# Patient Record
Sex: MALE | Race: BLACK OR AFRICAN AMERICAN
[De-identification: names, ages, dates, MRNs, and addresses within clinical notes are randomized per-mention and may not be internally consistent; named-entity substitution may affect disease eponyms.]

---

## 2017-05-14 ENCOUNTER — HOSPITAL ENCOUNTER (EMERGENCY)
Dept: HOSPITAL 61 - ER | Age: 57
Discharge: HOME | End: 2017-05-14
Payer: COMMERCIAL

## 2017-05-14 VITALS
DIASTOLIC BLOOD PRESSURE: 82 MMHG | SYSTOLIC BLOOD PRESSURE: 136 MMHG | DIASTOLIC BLOOD PRESSURE: 82 MMHG | SYSTOLIC BLOOD PRESSURE: 136 MMHG

## 2017-05-14 VITALS — HEIGHT: 76 IN | BODY MASS INDEX: 33.36 KG/M2 | WEIGHT: 274 LBS
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
## 2017-05-14 DIAGNOSIS — K21.9: ICD-10-CM
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
## 2017-05-14 DIAGNOSIS — E11.9: ICD-10-CM

## 2017-05-14 DIAGNOSIS — S61.412A: Primary | ICD-10-CM
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
## 2017-05-14 PROCEDURE — 90715 TDAP VACCINE 7 YRS/> IM: CPT

## 2017-05-14 PROCEDURE — 12001 RPR S/N/AX/GEN/TRNK 2.5CM/<: CPT

## 2017-05-14 PROCEDURE — 90471 IMMUNIZATION ADMIN: CPT

## 2017-05-14 NOTE — PHYS DOC
Past Medical History


Past Medical History:  Diabetes-Type II, GERD


Additional Past Medical Histor:  seasonal allergies


Past Surgical History:  No Surgical History


Alcohol Use:  None


Drug Use:  None





Adult General


Chief Complaint


Chief Complaint:  LACERATION/AVULSION





HPI


HPI





Patient is a 57  year old male with history of diabetes type 2 who presents 

today with left lateral hand laceration. Patient states he got cut accidentally 

with an electric knife wearing rubber gloves cutting bushes.





Review of Systems


Review of Systems





Constitutional: Denies fever or chills []


Eyes: Denies change in visual acuity, redness, or eye pain []


HENT: Denies nasal congestion or sore throat []


Musculoskeletal: Denies back pain or joint pain []


Integument: Left lateral hand laceration


Neurologic: Denies headache, focal weakness or sensory changes []


Endocrine: Denies polyuria or polydipsia []





Current Medications


Current Medications





Current Medications








 Medications


  (Trade)  Dose


 Ordered  Sig/Mary  Start Time


 Stop Time Status Last Admin


Dose Admin


 


 Diphtheria/


 Tetanus/Acell


 Pertussis


  (Boostrix)  0.5 ml  ONCE ONCE  5/14/17 19:30


 5/14/17 19:31 DC  


 


 


 Lidocaine/Sodium


 Bicarbonate


  (Buffered


 Lidocaine 1%)  20 ml  1X  ONCE  5/14/17 19:30


 5/14/17 19:31 DC 5/14/17 19:30


20 ML











Allergies


Allergies





Allergies








Coded Allergies Type Severity Reaction Last Updated Verified


 


  No Known Drug Allergies    2/27/16 No











Physical Exam


Physical Exam





Constitutional: Well developed, well nourished, no acute distress, non-toxic 

appearance. []


HENT: Normocephalic, atraumatic, bilateral external ears normal, oropharynx 

moist, no oral exudates, nose normal. []


Eyes: PERRLA, EOMI, conjunctiva normal, no discharge. [] 


Neck: Normal range of motion, no tenderness, supple, no stridor. [] 


Skin: Left lateral hand just below the distal transverse line with a horizontal 

laceration approximately 2 cm long. There is no obvious tendon involvement. 

Patient able to flex and extend the left hand and fingers with no difficulties. 

+2 left radial pulse. Adequate radial medial and ulnar sensation to the left 

hand and fingers. Cap refill less than 2 seconds the left upper extremity.


Back: No tenderness, no CVA tenderness. [] 


Extremities: No tenderness, no cyanosis, no clubbing, ROM intact, no edema. [] 


Neurologic: Alert and oriented X 3, normal motor function, normal sensory 

function, no focal deficits noted. []


Psychologic: Affect normal, judgement normal, mood normal. []





Current Patient Data


Vital Signs





 Vital Signs








  Date Time  Temp Pulse Resp B/P (MAP) Pulse Ox O2 Delivery O2 Flow Rate FiO2


 


5/14/17 19:20 98.5 110 18  95 Room Air  





 98.5       











EKG


EKG


[]





Radiology/Procedures


Radiology/Procedures


Indication: Left hand laceration





Procedure: The patient was placed in the appropriate position and anesthesia 

around the laceration was 1% buffered lidocaine. The area was then cleaned with 

100 ML of normal saline and Betadine. The laceration was closed with 3 

interrupted sutures using 4. 0 Ethilon. The wound was covered with gauze





Total repaired wound length: Approximately 2 cm





Other Items: None





The patient tolerated the procedure well





Complications: well





Course & Med Decision Making


Course & Med Decision Making


Pertinent Labs and Imaging studies reviewed. (See chart for details)





Patient has laceration to the left lateral hand. He was wearing rubber gloves 

when he got cut. Laceration was closed as noted in procedures. He is to follow-

up with the ED in 7-10 days or the primary care doctor for suture removal. He 

was given tetanus in the ED. Provided return precautions and discharged in 

stable condition. Discharged with Cipro.





Dragon Disclaimer


Dragon Disclaimer


This electronic medical record was generated, in whole or in part, using a 

voice recognition dictation system.





Departure


Departure


Impression:  


 Primary Impression:  


 Laceration of left hand


Disposition:  01 HOME, SELF-CARE


Condition:  STABLE


Referrals:  


YADIEL GEORGE DO (PCP)


Follow-up with your own doctor or the emergency room in 7-10 days for suture 

removal


Patient Instructions:  Laceration Care, Adult





Additional Instructions:  


You were seen with left hand laceration. Keep the area clean and dry. Apply 

Neosporin to it twice a day. Follow-up with your doctor or the emergency room 

in 7-10 days for suture removal. You received tetanus in the emergency room 

today.


Scripts


Ciprofloxacin Hcl (CIPRO) 500 Mg Tablet


1 TAB PO BID, #14 TAB


   Prov: MARIAMA SMITH APRN         5/14/17





Problem Qualifiers








 Primary Impression:  


 Laceration of left hand


 Encounter type:  initial encounter  Qualified Codes:  S61.412A - Laceration 

without foreign body of left hand, initial encounter








MUTUNGA,MARIAMA APRN May 14, 2017 19:29

## 2017-09-29 ENCOUNTER — HOSPITAL ENCOUNTER (OUTPATIENT)
Dept: HOSPITAL 35 - PAIN | Age: 57
Discharge: HOME | End: 2017-09-29
Attending: ANESTHESIOLOGY
Payer: COMMERCIAL

## 2017-09-29 VITALS — DIASTOLIC BLOOD PRESSURE: 94 MMHG | SYSTOLIC BLOOD PRESSURE: 139 MMHG

## 2017-09-29 VITALS — BODY MASS INDEX: 32.63 KG/M2 | HEIGHT: 75.98 IN | WEIGHT: 268 LBS

## 2017-09-29 DIAGNOSIS — M48.02: ICD-10-CM

## 2017-09-29 DIAGNOSIS — I25.10: ICD-10-CM

## 2017-09-29 DIAGNOSIS — Z98.890: ICD-10-CM

## 2017-09-29 DIAGNOSIS — K21.9: ICD-10-CM

## 2017-09-29 DIAGNOSIS — M54.12: Primary | ICD-10-CM

## 2017-09-29 DIAGNOSIS — Z79.1: ICD-10-CM

## 2017-09-29 DIAGNOSIS — E11.9: ICD-10-CM

## 2017-10-13 ENCOUNTER — HOSPITAL ENCOUNTER (OUTPATIENT)
Dept: HOSPITAL 35 - PAIN | Age: 57
End: 2017-10-13
Attending: ANESTHESIOLOGY
Payer: COMMERCIAL

## 2017-10-13 VITALS — DIASTOLIC BLOOD PRESSURE: 58 MMHG | SYSTOLIC BLOOD PRESSURE: 168 MMHG

## 2017-10-13 VITALS — HEIGHT: 75.98 IN | BODY MASS INDEX: 32.88 KG/M2 | WEIGHT: 270 LBS

## 2017-10-13 DIAGNOSIS — Z79.82: ICD-10-CM

## 2017-10-13 DIAGNOSIS — Z79.899: ICD-10-CM

## 2017-10-13 DIAGNOSIS — M54.12: Primary | ICD-10-CM

## 2017-12-18 ENCOUNTER — HOSPITAL ENCOUNTER (OUTPATIENT)
Dept: HOSPITAL 35 - PAIN | Age: 57
Discharge: HOME | End: 2017-12-18
Attending: ANESTHESIOLOGY
Payer: COMMERCIAL

## 2017-12-18 VITALS — DIASTOLIC BLOOD PRESSURE: 87 MMHG | SYSTOLIC BLOOD PRESSURE: 126 MMHG

## 2017-12-18 VITALS — HEIGHT: 75.98 IN | BODY MASS INDEX: 33.17 KG/M2 | WEIGHT: 272.4 LBS

## 2017-12-18 DIAGNOSIS — M54.12: Primary | ICD-10-CM

## 2017-12-18 DIAGNOSIS — Z79.82: ICD-10-CM

## 2017-12-18 DIAGNOSIS — Z79.899: ICD-10-CM

## 2017-12-18 DIAGNOSIS — G89.29: ICD-10-CM

## 2018-02-02 ENCOUNTER — HOSPITAL ENCOUNTER (OUTPATIENT)
Dept: HOSPITAL 35 - RAD | Age: 58
End: 2018-02-02
Attending: FAMILY MEDICINE
Payer: COMMERCIAL

## 2018-02-02 DIAGNOSIS — R05: Primary | ICD-10-CM

## 2018-04-02 ENCOUNTER — HOSPITAL ENCOUNTER (OUTPATIENT)
Dept: HOSPITAL 35 - ULTRA | Age: 58
End: 2018-04-02
Attending: FAMILY MEDICINE
Payer: COMMERCIAL

## 2018-04-02 DIAGNOSIS — M25.562: Primary | ICD-10-CM

## 2018-04-17 ENCOUNTER — HOSPITAL (OUTPATIENT)
Dept: OTHER | Age: 58
End: 2018-04-17
Attending: NURSE PRACTITIONER

## 2018-05-14 ENCOUNTER — HOSPITAL ENCOUNTER (OUTPATIENT)
Dept: HOSPITAL 35 - PAIN | Age: 58
Discharge: HOME | End: 2018-05-14
Attending: ANESTHESIOLOGY
Payer: COMMERCIAL

## 2018-05-14 VITALS — HEIGHT: 72.01 IN | WEIGHT: 268 LBS | BODY MASS INDEX: 36.3 KG/M2

## 2018-05-14 VITALS — DIASTOLIC BLOOD PRESSURE: 79 MMHG | SYSTOLIC BLOOD PRESSURE: 126 MMHG

## 2018-05-14 DIAGNOSIS — M54.12: Primary | ICD-10-CM

## 2018-05-14 DIAGNOSIS — Z79.899: ICD-10-CM

## 2018-05-14 DIAGNOSIS — Z98.890: ICD-10-CM

## 2018-05-14 DIAGNOSIS — G89.29: ICD-10-CM

## 2018-05-14 DIAGNOSIS — Z79.82: ICD-10-CM

## 2018-05-31 ENCOUNTER — HOSPITAL ENCOUNTER (OUTPATIENT)
Dept: HOSPITAL 35 - PAIN | Age: 58
End: 2018-05-31
Attending: ANESTHESIOLOGY
Payer: COMMERCIAL

## 2018-05-31 VITALS — SYSTOLIC BLOOD PRESSURE: 135 MMHG | DIASTOLIC BLOOD PRESSURE: 83 MMHG

## 2018-05-31 VITALS — BODY MASS INDEX: 32.39 KG/M2 | HEIGHT: 75.98 IN | WEIGHT: 266 LBS

## 2018-05-31 DIAGNOSIS — M54.12: Primary | ICD-10-CM

## 2018-06-18 ENCOUNTER — HOSPITAL ENCOUNTER (OUTPATIENT)
Dept: HOSPITAL 35 - PAIN | Age: 58
Discharge: HOME | End: 2018-06-18
Attending: ANESTHESIOLOGY
Payer: COMMERCIAL

## 2018-06-18 VITALS — WEIGHT: 260.4 LBS | HEIGHT: 75.98 IN | BODY MASS INDEX: 31.71 KG/M2

## 2018-06-18 VITALS — DIASTOLIC BLOOD PRESSURE: 81 MMHG | SYSTOLIC BLOOD PRESSURE: 129 MMHG

## 2018-06-18 DIAGNOSIS — M54.12: Primary | ICD-10-CM

## 2018-06-18 DIAGNOSIS — Z98.890: ICD-10-CM

## 2018-06-18 DIAGNOSIS — Z79.899: ICD-10-CM

## 2018-06-18 DIAGNOSIS — G89.29: ICD-10-CM

## 2018-06-18 DIAGNOSIS — Z79.82: ICD-10-CM

## 2018-08-07 ENCOUNTER — HOSPITAL ENCOUNTER (OUTPATIENT)
Dept: HOSPITAL 61 - PCVCIMAG | Age: 58
Discharge: HOME | End: 2018-08-07
Attending: INTERNAL MEDICINE
Payer: COMMERCIAL

## 2018-08-07 DIAGNOSIS — E11.9: ICD-10-CM

## 2018-08-07 DIAGNOSIS — I51.7: ICD-10-CM

## 2018-08-07 DIAGNOSIS — R06.09: ICD-10-CM

## 2018-08-07 DIAGNOSIS — I25.10: Primary | ICD-10-CM

## 2018-08-07 PROCEDURE — 93306 TTE W/DOPPLER COMPLETE: CPT

## 2019-03-14 ENCOUNTER — HOSPITAL ENCOUNTER (OUTPATIENT)
Dept: HOSPITAL 61 - PCVCIMAG | Age: 59
Discharge: HOME | End: 2019-03-14
Attending: INTERNAL MEDICINE
Payer: COMMERCIAL

## 2019-03-14 DIAGNOSIS — E11.9: ICD-10-CM

## 2019-03-14 DIAGNOSIS — E78.5: ICD-10-CM

## 2019-03-14 DIAGNOSIS — I10: ICD-10-CM

## 2019-03-14 DIAGNOSIS — I25.10: Primary | ICD-10-CM

## 2019-03-14 DIAGNOSIS — I48.0: ICD-10-CM

## 2019-03-14 PROCEDURE — 93325 DOPPLER ECHO COLOR FLOW MAPG: CPT

## 2019-03-14 PROCEDURE — 93351 STRESS TTE COMPLETE: CPT

## 2019-03-15 NOTE — PCVCIMAG
--------------- APPROVED REPORT --------------





Study performed:  03/14/2019 14:57:55



Exam:  Stress Echocardiogram

Indication: CAD , Hyperlipidemia, Hypertension

Patient Location: Echo lab

Stress Nurse: Urszula Rader RN

Status: routine



Ht: 5 ft 5 in  

HR: 122 bpm      BP: 110/80 mmHg

Rhythm: Sinus Tachycardia



Medical History

Medical History: HTN, Hyperlipidemia, Diabetes, paroxysmal atrial 

fib



Procedure

The patient underwent an Exercise Stress Test using the Mike 

Protocol. Blood pressure, heart rate, and EKG were monitored.

An Echocardiogram was performed by technician in four stages in quad 

fashion.  At peak stress, four selected images were obtained and 

placed side by side with resting images for comparison.



Stress Test Details

Stress Test:  Exercise stress testing was performed using a Mike 

protocol.

HR

Resting HR:            122 bpmMax Heart Rate (APMHR): 161 bpm 



Max HR Achieved:  155 bpmTarget HR (85% APMHR): 136 bpm

% of APMHR:         96

Recovery HR:            120 bpm

HR response to stress: Accelerated HR response to stress



BP

Resting BP:  110/80 mmHg

Max BP:       162/80 mmHg

Recovery BP:       122/68 mmHg

BP response to stress: Normal blood pressure response to 

stress.

ECG

Resting ECG:  Sinus Tachycardia

Stress ECG:     Sinus Tachycardia

Recovery ECG: Sinus Tachycardia



Clinical

Reason for Termination: Maximal effort

Exercise duration: 7 min 38 sec

Highest Stage Achieved: Stage 3: 3.4 mph at 14% grade. 

Exercise capacity: 10.40 METs

Overall Exercise Capacity for Age: Poor



Stress ECG Conclusion

1.subjectively negative for ischemia

2. electrocardiographically negative for ischemia

3. reduced functional capacity



Pre-Stress Echo

The resting Echocardiogram showed abnormal left ventricular 

contractility with an estimated Ejection Fraction of about 40-45%. 

Distal septal hypokinesis.



Post-Stress Echo

The stress Echocardiogram showed abnormal left ventricular 

contractility with an estimated Ejection Fraction of about 45-50%. 

Distal septal hypokineis.



Conclusion

Clinical Response:  Non-ischemic

Exercise Capacity:  Below Average

Stress ECG Response:  Non-ischemic

Stress Echo Images:  Non-ischemic

1. low to intermediate risk study based on wall motion abnormalities 

at rest



Other Information

Study Quality: Technically Difficult



<Conclusion>

1. low to intermediate risk study based on wall motion abnormalities 

at rest

## 2019-12-04 ENCOUNTER — HOSPITAL (OUTPATIENT)
Dept: OTHER | Age: 59
End: 2019-12-04
Attending: HOSPITALIST

## 2019-12-04 LAB
A/G RATIO_: 1.1
ABS LYMPH: 1.1 K/CUMM (ref 1–3.5)
ABS MONO: 0.8 K/CUMM (ref 0.1–0.8)
ABS NEUTRO: 5.4 K/CUMM (ref 2–8)
ACETAMINOPH LVL: <3 UG/ML (ref 10–30)
ALBUMIN: 3.7 G/DL (ref 3.5–5)
ALCOHOL, ETHYL: 79 MG/DL (ref 0–10)
ALK PHOS: 66 UNIT/L (ref 50–124)
ALT/GPT: 56 UNIT/L (ref 0–55)
ANION GAP SERPL CALC-SCNC: 12 MEQ/L (ref 10–20)
ANION GAP SERPL CALC-SCNC: 18 MEQ/L (ref 10–20)
AST/GOT: 96 UNIT/L (ref 5–34)
BASOPHIL: 0 % (ref 0–1)
BILI TOTAL: 1.2 MG/DL (ref 0.2–1)
BUN SERPL-MCNC: 21 MG/DL (ref 6–20)
BUN SERPL-MCNC: 23 MG/DL (ref 6–20)
CALCIUM: 9.1 MG/DL (ref 8.4–10.2)
CALCIUM: 9.5 MG/DL (ref 8.4–10.2)
CHLORIDE: 76 MEQ/L (ref 97–107)
CHLORIDE: 82 MEQ/L (ref 97–107)
CREATININE: 1.41 MG/DL (ref 0.6–1.3)
CREATININE: 1.5 MG/DL (ref 0.6–1.3)
DIFF_TYPE?: ABNORMAL
EOSINOPHIL: 0 % (ref 0–6)
GLOBULIN_: 3.5 G/DL (ref 2–4.1)
GLUCOSE LVL: 143 MG/DL (ref 70–99)
GLUCOSE LVL: 150 MG/DL (ref 70–99)
HCT VFR BLD CALC: 46 % (ref 36–51)
HEMOLYSIS 2+: ABNORMAL
HEMOLYSIS 2+: NEGATIVE
HEMOLYSIS 2+: NORMAL
HEMOLYSIS 3+: NORMAL
HEMOLYSIS 4+: NORMAL
HGB BLD-MCNC: 15.8 G/DL (ref 12–17)
ICTERIC 4+: NEGATIVE
IMMATURE GRAN: 0.7 % (ref 0–0.3)
INSTR WBC: 7.4 K/CUMM (ref 4–11)
LIPEMIC 3+: NEGATIVE
LIPEMIC 4+: NEGATIVE
LYMPHOCYTE: 15 %
MAGNESIUM LEVEL: 1.8 MG/DL (ref 1.6–2.6)
MCH RBC QN AUTO: 31 PG (ref 25–35)
MCHC RBC AUTO-ENTMCNC: 34 G/DL (ref 32–37)
MCV RBC AUTO: 89 FL (ref 78–97)
MONOCYTE: 11 %
NEUTROPHIL: 72 %
NRBC BLD MANUAL-RTO: 0 % (ref 0–0.2)
PHOSPHORUS: 2.3 MG/DL (ref 2.3–4.7)
PLATELET: 64 K/CUMM (ref 150–450)
POTASSIUM: 2.4 MEQ/L (ref 3.5–5.1)
POTASSIUM: 2.9 MEQ/L (ref 3.5–5.1)
RBC # BLD: 5.16 M/CUMM (ref 4.2–6)
RDW: 13.5 % (ref 11.5–14.5)
SALICYLATE LVL: <5 MG/DL (ref 0–20)
SODIUM: 128 MEQ/L (ref 136–145)
SODIUM: 131 MEQ/L (ref 136–145)
TCO2: 36 MEQ/L (ref 19–29)
TCO2: 40 MEQ/L (ref 19–29)
TOTAL PROTEIN: 7.2 G/DL (ref 6.4–8.3)
TROPONIN I: 0.03 NG/ML
U AMPH SCRN: NEGATIVE
U BARB SCRN: NEGATIVE
U BENZODIA SCRN: NEGATIVE
U COCAINE SCRN: NEGATIVE
U OPIATE SCRN: NEGATIVE
U PCP SCRN: NEGATIVE
U THC SCRN: NEGATIVE
UA APPEAR: CLEAR
UA BACTERIA: ABNORMAL
UA BILI: NEGATIVE
UA BLOOD: ABNORMAL
UA COLOR: YELLOW
UA EPITHELIAL: ABNORMAL
UA GLUCOSE: NEGATIVE
UA KETONES: NEGATIVE
UA LEUK EST: NEGATIVE
UA NITRITE: NEGATIVE
UA PH: 6 (ref 5–7)
UA PROTEIN: NEGATIVE
UA RBC: ABNORMAL
UA SPEC GRAV: <=1.005 (ref 1.01–1.02)
UA UROBILINOGEN: 0.2 MG/DL (ref 0.2–1)
UA WBC: ABNORMAL
WBC # BLD: 7.4 K/CUMM (ref 4–11)

## 2019-12-04 PROCEDURE — 99223 1ST HOSP IP/OBS HIGH 75: CPT | Performed by: HOSPITALIST

## 2019-12-05 LAB
A/G RATIO_: 1
ABS NEUTRO: 3.5 K/CUMM (ref 2–8)
ALBUMIN: 3.2 G/DL (ref 3.5–5)
ALK PHOS: 62 UNIT/L (ref 50–124)
ALT/GPT: 53 UNIT/L (ref 0–55)
ANION GAP SERPL CALC-SCNC: 11 MEQ/L (ref 10–20)
ANION GAP SERPL CALC-SCNC: 11 MEQ/L (ref 10–20)
AST/GOT: 70 UNIT/L (ref 5–34)
BILI TOTAL: 1.2 MG/DL (ref 0.2–1)
BUN SERPL-MCNC: 18 MG/DL (ref 6–20)
BUN SERPL-MCNC: 20 MG/DL (ref 6–20)
CALCIUM: 9 MG/DL (ref 8.4–10.2)
CALCIUM: 9 MG/DL (ref 8.4–10.2)
CHLORIDE: 93 MEQ/L (ref 97–107)
CHLORIDE: 95 MEQ/L (ref 97–107)
CREATININE: 1.28 MG/DL (ref 0.6–1.3)
CREATININE: 1.31 MG/DL (ref 0.6–1.3)
GLOBULIN_: 3.1 G/DL (ref 2–4.1)
GLUCOSE LVL: 120 MG/DL (ref 70–99)
GLUCOSE LVL: 150 MG/DL (ref 70–99)
HCT VFR BLD CALC: 42 % (ref 36–51)
HEMOLYSIS 2+: ABNORMAL
HEMOLYSIS 2+: NEGATIVE
HEMOLYSIS 2+: NEGATIVE
HGB BLD-MCNC: 14.6 G/DL (ref 12–17)
INSTR WBC: 5.4 K/CUMM (ref 4–11)
LIPEMIC 3+: NEGATIVE
MAGNESIUM LEVEL: 1.9 MG/DL (ref 1.6–2.6)
MCH RBC QN AUTO: 31 PG (ref 25–35)
MCHC RBC AUTO-ENTMCNC: 34 G/DL (ref 32–37)
MCV RBC AUTO: 89 FL (ref 78–97)
NRBC BLD MANUAL-RTO: 0 % (ref 0–0.2)
PLATELET: 69 K/CUMM (ref 150–450)
POTASSIUM: 2.9 MEQ/L (ref 3.5–5.1)
POTASSIUM: 3.7 MEQ/L (ref 3.5–5.1)
RBC # BLD: 4.76 M/CUMM (ref 4.2–6)
RDW: 13.8 % (ref 11.5–14.5)
SODIUM: 134 MEQ/L (ref 136–145)
SODIUM: 134 MEQ/L (ref 136–145)
TCO2: 32 MEQ/L (ref 19–29)
TCO2: 33 MEQ/L (ref 19–29)
TOTAL PROTEIN: 6.3 G/DL (ref 6.4–8.3)
WBC # BLD: 5.4 K/CUMM (ref 4–11)

## 2019-12-05 PROCEDURE — 99233 SBSQ HOSP IP/OBS HIGH 50: CPT | Performed by: HOSPITALIST

## 2019-12-06 LAB
ABS NEUTRO: 3.6 K/CUMM (ref 2–8)
ANION GAP SERPL CALC-SCNC: 9 MEQ/L (ref 10–20)
BUN SERPL-MCNC: 17 MG/DL (ref 6–20)
CALCIUM: 9 MG/DL (ref 8.4–10.2)
CHLORIDE: 98 MEQ/L (ref 97–107)
CREATININE: 1.21 MG/DL (ref 0.6–1.3)
GLUCOSE LVL: 127 MG/DL (ref 70–99)
HCT VFR BLD CALC: 43 % (ref 36–51)
HEMOLYSIS 2+: NEGATIVE
HGB BLD-MCNC: 14.5 G/DL (ref 12–17)
INSTR WBC: 6.3 K/CUMM (ref 4–11)
MCH RBC QN AUTO: 31 PG (ref 25–35)
MCHC RBC AUTO-ENTMCNC: 34 G/DL (ref 32–37)
MCV RBC AUTO: 91 FL (ref 78–97)
NRBC BLD MANUAL-RTO: 0 % (ref 0–0.2)
PLATELET: 101 K/CUMM (ref 150–450)
POTASSIUM: 3.2 MEQ/L (ref 3.5–5.1)
RBC # BLD: 4.71 M/CUMM (ref 4.2–6)
RDW: 13.8 % (ref 11.5–14.5)
SODIUM: 133 MEQ/L (ref 136–145)
TCO2: 29 MEQ/L (ref 19–29)
WBC # BLD: 6.3 K/CUMM (ref 4–11)

## 2019-12-06 PROCEDURE — 99232 SBSQ HOSP IP/OBS MODERATE 35: CPT | Performed by: HOSPITALIST

## 2019-12-06 PROCEDURE — 90791 PSYCH DIAGNOSTIC EVALUATION: CPT | Performed by: PSYCHOLOGIST

## 2019-12-07 LAB
ABS NEUTRO: 3.1 K/CUMM (ref 2–8)
ANION GAP SERPL CALC-SCNC: 8 MEQ/L (ref 10–20)
BUN SERPL-MCNC: 28 MG/DL (ref 6–20)
CALCIUM: 9 MG/DL (ref 8.4–10.2)
CHLORIDE: 100 MEQ/L (ref 97–107)
CREATININE: 1.22 MG/DL (ref 0.6–1.3)
GLUCOSE LVL: 167 MG/DL (ref 70–99)
HCT VFR BLD CALC: 40 % (ref 36–51)
HEMOLYSIS 2+: NEGATIVE
HGB BLD-MCNC: 13.4 G/DL (ref 12–17)
INSTR WBC: 6.4 K/CUMM (ref 4–11)
MCH RBC QN AUTO: 30 PG (ref 25–35)
MCHC RBC AUTO-ENTMCNC: 34 G/DL (ref 32–37)
MCV RBC AUTO: 90 FL (ref 78–97)
NRBC BLD MANUAL-RTO: 0 % (ref 0–0.2)
PLATELET: 167 K/CUMM (ref 150–450)
POTASSIUM: 3.7 MEQ/L (ref 3.5–5.1)
RBC # BLD: 4.39 M/CUMM (ref 4.2–6)
RDW: 13.9 % (ref 11.5–14.5)
SODIUM: 133 MEQ/L (ref 136–145)
TCO2: 29 MEQ/L (ref 19–29)
WBC # BLD: 6.4 K/CUMM (ref 4–11)

## 2019-12-07 PROCEDURE — 99239 HOSP IP/OBS DSCHRG MGMT >30: CPT | Performed by: HOSPITALIST

## 2020-03-20 ENCOUNTER — HOSPITAL (OUTPATIENT)
Dept: OTHER | Age: 60
End: 2020-03-20
Attending: HOSPITALIST

## 2020-03-20 ENCOUNTER — HOSPITAL (OUTPATIENT)
Dept: OTHER | Age: 60
End: 2020-03-20

## 2020-03-20 LAB
A/G RATIO_: 1.2
ABS LYMPH: 1.5 K/CUMM (ref 1–3.5)
ABS MONO: 0.5 K/CUMM (ref 0.1–0.8)
ABS NEUTRO: 2.4 K/CUMM (ref 2–8)
ALBUMIN: 3.8 G/DL (ref 3.5–5)
ALCOHOL, ETHYL: 296 MG/DL (ref 0–10)
ALK PHOS: 82 UNIT/L (ref 50–124)
ALT/GPT: 112 UNIT/L (ref 0–55)
ANION GAP SERPL CALC-SCNC: 20 MEQ/L (ref 10–20)
APTT PPP: 24 SECONDS (ref 22–30)
AST/GOT: 255 UNIT/L (ref 5–34)
BASOPHIL: 2 % (ref 0–1)
BILI TOTAL: 0.9 MG/DL (ref 0.2–1)
BUN SERPL-MCNC: 6 MG/DL (ref 6–20)
CALCIUM: 9.3 MG/DL (ref 8.4–10.2)
CHLORIDE: 103 MEQ/L (ref 97–107)
CK INDEX: 2.1
CK-MB: 2.1 NG/ML
CREATININE: 0.73 MG/DL (ref 0.6–1.3)
DIFF_TYPE?: ABNORMAL
EOSINOPHIL: 2 % (ref 0–6)
GLOBULIN_: 3.3 G/DL (ref 2–4.1)
GLUCOSE LVL: 104 MG/DL (ref 70–99)
HAVM INSTR: 0.14
HBCO IGM: NON REACTIVE
HBSAG: NON REACTIVE
HCT VFR BLD CALC: 43 % (ref 36–51)
HCV INSTR: 0.07
HEMOLYSIS 2+: NEGATIVE
HEMOLYSIS 2+: NEGATIVE
HEMOLYSIS 3+: NEGATIVE
HEMOLYSIS 3+: NEGATIVE
HEMOLYSIS 4+: NEGATIVE
HEP A IGM: NON REACTIVE
HEP B CORE IGM INSTR: 0.07
HEP BS AG INSTR: 0.13
HEP C AB: NON REACTIVE
HGB BLD-MCNC: 14.6 G/DL (ref 12–17)
ICTERIC 4+: NEGATIVE
ICTERIC 4+: NEGATIVE
IMMATURE GRAN: 0.6 % (ref 0–0.3)
INR: 1 (ref 0.9–1.1)
INSTR WBC: 4.7 K/CUMM (ref 4–11)
LIPASE LEVEL: 8 UNIT/L (ref 8–78)
LIPEMIC 3+: NEGATIVE
LIPEMIC 4+: NEGATIVE
LYMPHOCYTE: 32 %
MAGNESIUM LEVEL: 1.7 MG/DL (ref 1.6–2.6)
MCH RBC QN AUTO: 34 PG (ref 25–35)
MCHC RBC AUTO-ENTMCNC: 34 G/DL (ref 32–37)
MCV RBC AUTO: 99 FL (ref 78–97)
MONOCYTE: 11 %
NEUTROPHIL: 52 %
NRBC BLD MANUAL-RTO: 0 % (ref 0–0.2)
PHOSPHORUS: 4.2 MG/DL (ref 2.3–4.7)
PLATELET: 163 K/CUMM (ref 150–450)
POTASSIUM: 3.6 MEQ/L (ref 3.5–5.1)
PROTHROMBIN TIME: 10.3 SECONDS (ref 9.7–11.6)
RBC # BLD: 4.36 M/CUMM (ref 4.2–6)
RDW: 13.5 % (ref 11.5–14.5)
SODIUM: 140 MEQ/L (ref 136–145)
TCO2: 21 MEQ/L (ref 19–29)
TOTAL CK: 100 U/L (ref 30–200)
TOTAL PROTEIN: 7.1 G/DL (ref 6.4–8.3)
TROPONIN I: <0.01 NG/ML
U AMPH SCRN: NEGATIVE
U BARB SCRN: NEGATIVE
U BENZODIA SCRN: NEGATIVE
U COCAINE SCRN: NEGATIVE
U OPIATE SCRN: NEGATIVE
U PCP SCRN: NEGATIVE
U THC SCRN: POSITIVE
UA APPEAR: CLEAR
UA BILI: NEGATIVE
UA BLOOD: NEGATIVE
UA COLOR: YELLOW
UA GLUCOSE: NEGATIVE
UA KETONES: NEGATIVE
UA LEUK EST: NEGATIVE
UA NITRITE: NEGATIVE
UA PH: 6 (ref 5–7)
UA PROTEIN: NEGATIVE
UA SPEC GRAV: <=1.005 (ref 1.01–1.02)
UA UROBILINOGEN: 0.2 MG/DL (ref 0.2–1)
WBC # BLD: 4.7 K/CUMM (ref 4–11)

## 2020-03-20 PROCEDURE — 99223 1ST HOSP IP/OBS HIGH 75: CPT | Performed by: HOSPITALIST

## 2020-03-21 LAB
A/G RATIO_: 1.3
ABS NEUTRO: 3 K/CUMM (ref 2–8)
ALBUMIN: 3.5 G/DL (ref 3.5–5)
ALK PHOS: 72 UNIT/L (ref 50–124)
ALT/GPT: 91 UNIT/L (ref 0–55)
ANION GAP SERPL CALC-SCNC: 14 MEQ/L (ref 10–20)
AST/GOT: 189 UNIT/L (ref 5–34)
BILI DIRECT: 0.9 MG/DL (ref 0–0.5)
BILI TOTAL: 1.7 MG/DL (ref 0.2–1)
BUN SERPL-MCNC: 7 MG/DL (ref 6–20)
CALCIUM: 8 MG/DL (ref 8.4–10.2)
CHLORIDE: 105 MEQ/L (ref 97–107)
CREATININE: 0.77 MG/DL (ref 0.6–1.3)
GLOBULIN_: 2.8 G/DL (ref 2–4.1)
GLUCOSE LVL: 95 MG/DL (ref 70–99)
HCT VFR BLD CALC: 38 % (ref 36–51)
HEMOLYSIS 2+: NEGATIVE
HGB BLD-MCNC: 13.3 G/DL (ref 12–17)
INSTR WBC: 5.1 K/CUMM (ref 4–11)
LIPEMIC 2+: NEGATIVE
MAGNESIUM LEVEL: 1.8 MG/DL (ref 1.6–2.6)
MCH RBC QN AUTO: 34 PG (ref 25–35)
MCHC RBC AUTO-ENTMCNC: 36 G/DL (ref 32–37)
MCV RBC AUTO: 97 FL (ref 78–97)
NRBC BLD MANUAL-RTO: 0 % (ref 0–0.2)
PLATELET: 146 K/CUMM (ref 150–450)
POTASSIUM: 3.8 MEQ/L (ref 3.5–5.1)
RBC # BLD: 3.88 M/CUMM (ref 4.2–6)
RDW: 13.4 % (ref 11.5–14.5)
SODIUM: 138 MEQ/L (ref 136–145)
TCO2: 23 MEQ/L (ref 19–29)
TOTAL PROTEIN: 6.3 G/DL (ref 6.4–8.3)
WBC # BLD: 5.1 K/CUMM (ref 4–11)

## 2020-03-21 PROCEDURE — 99233 SBSQ HOSP IP/OBS HIGH 50: CPT | Performed by: INTERNAL MEDICINE

## 2020-03-22 LAB
A/G RATIO_: 1
ALBUMIN: 3.4 G/DL (ref 3.5–5)
ALK PHOS: 72 UNIT/L (ref 50–124)
ALT/GPT: 74 UNIT/L (ref 0–55)
ANION GAP SERPL CALC-SCNC: 14 MEQ/L (ref 10–20)
AST/GOT: 95 UNIT/L (ref 5–34)
BILI DIRECT: 0.7 MG/DL (ref 0–0.5)
BILI TOTAL: 1.5 MG/DL (ref 0.2–1)
BUN SERPL-MCNC: 9 MG/DL (ref 6–20)
CALCIUM: 8.2 MG/DL (ref 8.4–10.2)
CHLORIDE: 103 MEQ/L (ref 97–107)
CREATININE: 0.72 MG/DL (ref 0.6–1.3)
GLOBULIN_: 3.4 G/DL (ref 2–4.1)
GLUCOSE LVL: 115 MG/DL (ref 70–99)
HEMOLYSIS 2+: ABNORMAL
HEMOLYSIS 2+: ABNORMAL
HEMOLYSIS 2+: NORMAL
LIPEMIC 2+: NEGATIVE
MAGNESIUM LEVEL: 1.6 MG/DL (ref 1.6–2.6)
POTASSIUM: 4.3 MEQ/L (ref 3.5–5.1)
SODIUM: 135 MEQ/L (ref 136–145)
TCO2: 22 MEQ/L (ref 19–29)
TOTAL PROTEIN: 6.8 G/DL (ref 6.4–8.3)

## 2020-03-22 PROCEDURE — 99233 SBSQ HOSP IP/OBS HIGH 50: CPT | Performed by: INTERNAL MEDICINE

## 2020-03-23 LAB
A/G RATIO_: 1.2
ALBUMIN: 3.7 G/DL (ref 3.5–5)
ALK PHOS: 74 UNIT/L (ref 50–124)
ALT/GPT: 59 UNIT/L (ref 0–55)
ANION GAP SERPL CALC-SCNC: 14 MEQ/L (ref 10–20)
AST/GOT: 58 UNIT/L (ref 5–34)
BILI TOTAL: 1.2 MG/DL (ref 0.2–1)
BUN SERPL-MCNC: 10 MG/DL (ref 6–20)
CALCIUM: 8.6 MG/DL (ref 8.4–10.2)
CHLORIDE: 104 MEQ/L (ref 97–107)
CREATININE: 0.73 MG/DL (ref 0.6–1.3)
GLOBULIN_: 3.2 G/DL (ref 2–4.1)
GLUCOSE LVL: 117 MG/DL (ref 70–99)
HEMOLYSIS 2+: NEGATIVE
HEMOLYSIS 2+: NEGATIVE
LIPEMIC 3+: NEGATIVE
MAGNESIUM LEVEL: 2 MG/DL (ref 1.6–2.6)
POTASSIUM: 4.1 MEQ/L (ref 3.5–5.1)
SODIUM: 135 MEQ/L (ref 136–145)
TCO2: 21 MEQ/L (ref 19–29)
TOTAL PROTEIN: 6.9 G/DL (ref 6.4–8.3)

## 2020-03-23 PROCEDURE — 99233 SBSQ HOSP IP/OBS HIGH 50: CPT | Performed by: INTERNAL MEDICINE

## 2020-03-24 LAB
ANION GAP SERPL CALC-SCNC: 15 MEQ/L (ref 10–20)
BUN SERPL-MCNC: 10 MG/DL (ref 6–20)
CALCIUM: 9.2 MG/DL (ref 8.4–10.2)
CHLORIDE: 103 MEQ/L (ref 97–107)
CREATININE: 0.84 MG/DL (ref 0.6–1.3)
GLUCOSE LVL: 153 MG/DL (ref 70–99)
HEMOLYSIS 2+: NEGATIVE
HEMOLYSIS 2+: NEGATIVE
MAGNESIUM LEVEL: 1.9 MG/DL (ref 1.6–2.6)
POTASSIUM: 4.2 MEQ/L (ref 3.5–5.1)
SODIUM: 134 MEQ/L (ref 136–145)
TCO2: 20 MEQ/L (ref 19–29)

## 2020-03-24 PROCEDURE — 99239 HOSP IP/OBS DSCHRG MGMT >30: CPT | Performed by: HOSPITALIST

## 2020-11-24 ENCOUNTER — HOSPITAL ENCOUNTER (OUTPATIENT)
Dept: HOSPITAL 35 - SJCVCIMAG | Age: 60
End: 2020-11-24
Attending: INTERNAL MEDICINE
Payer: COMMERCIAL

## 2020-11-24 DIAGNOSIS — I07.1: Primary | ICD-10-CM

## 2020-11-24 DIAGNOSIS — E11.9: ICD-10-CM

## 2020-11-24 DIAGNOSIS — I48.0: ICD-10-CM

## 2020-12-08 LAB
BILIRUB UR-MCNC: NEGATIVE MG/DL
COLOR UR: YELLOW
KETONES UR STRIP-MCNC: NEGATIVE MG/DL
RBC # UR STRIP: NEGATIVE /UL
SP GR UR STRIP: 1.02 (ref 1–1.03)
URINE CLARITY: CLEAR
URINE GLUCOSE-RANDOM*: (no result)
URINE LEUKOCYTES-REFLEX: NEGATIVE
URINE NITRITE-REFLEX: NEGATIVE
URINE PROTEIN (DIPSTICK): NEGATIVE
UROBILINOGEN UR STRIP-ACNC: 0.2 E.U./DL (ref 0.2–1)

## 2020-12-16 ENCOUNTER — HOSPITAL ENCOUNTER (OUTPATIENT)
Dept: HOSPITAL 35 - LAB | Age: 60
End: 2020-12-16
Attending: ORTHOPAEDIC SURGERY
Payer: COMMERCIAL

## 2020-12-16 DIAGNOSIS — Z20.828: ICD-10-CM

## 2020-12-16 DIAGNOSIS — Z01.812: Primary | ICD-10-CM

## 2020-12-21 ENCOUNTER — HOSPITAL ENCOUNTER (INPATIENT)
Dept: HOSPITAL 35 - OR | Age: 60
LOS: 2 days | Discharge: HOME | DRG: 470 | End: 2020-12-23
Attending: ORTHOPAEDIC SURGERY | Admitting: ORTHOPAEDIC SURGERY
Payer: COMMERCIAL

## 2020-12-21 VITALS — SYSTOLIC BLOOD PRESSURE: 133 MMHG | DIASTOLIC BLOOD PRESSURE: 85 MMHG

## 2020-12-21 VITALS — WEIGHT: 251 LBS | HEIGHT: 75 IN | BODY MASS INDEX: 31.21 KG/M2

## 2020-12-21 VITALS — SYSTOLIC BLOOD PRESSURE: 122 MMHG | DIASTOLIC BLOOD PRESSURE: 81 MMHG

## 2020-12-21 VITALS — DIASTOLIC BLOOD PRESSURE: 85 MMHG | SYSTOLIC BLOOD PRESSURE: 133 MMHG

## 2020-12-21 VITALS — SYSTOLIC BLOOD PRESSURE: 143 MMHG | DIASTOLIC BLOOD PRESSURE: 86 MMHG

## 2020-12-21 DIAGNOSIS — I10: ICD-10-CM

## 2020-12-21 DIAGNOSIS — M16.11: Primary | ICD-10-CM

## 2020-12-21 DIAGNOSIS — Z79.82: ICD-10-CM

## 2020-12-21 DIAGNOSIS — K21.9: ICD-10-CM

## 2020-12-21 DIAGNOSIS — G47.33: ICD-10-CM

## 2020-12-21 DIAGNOSIS — E11.9: ICD-10-CM

## 2020-12-21 DIAGNOSIS — Z79.899: ICD-10-CM

## 2020-12-21 PROCEDURE — 56521: CPT

## 2020-12-21 PROCEDURE — 50414: CPT

## 2020-12-21 PROCEDURE — 53000 INCISION OF URETHRA: CPT

## 2020-12-21 PROCEDURE — 0SR906Z REPLACEMENT OF RIGHT HIP JOINT WITH OXIDIZED ZIRCONIUM ON POLYETHYLENE SYNTHETIC SUBSTITUTE, OPEN APPROACH: ICD-10-PCS | Performed by: ORTHOPAEDIC SURGERY

## 2020-12-21 PROCEDURE — 57095: CPT

## 2020-12-21 PROCEDURE — 56525: CPT

## 2020-12-21 PROCEDURE — 62110: CPT

## 2020-12-21 PROCEDURE — 70005: CPT

## 2020-12-21 PROCEDURE — 51412: CPT

## 2020-12-21 PROCEDURE — 57103: CPT

## 2020-12-21 PROCEDURE — 50101: CPT

## 2020-12-21 PROCEDURE — 50010 RENAL EXPLORATION: CPT

## 2020-12-21 PROCEDURE — 50382 CHANGE URETER STENT PERCUT: CPT

## 2020-12-21 PROCEDURE — 10195: CPT

## 2020-12-21 PROCEDURE — 62900: CPT

## 2020-12-21 PROCEDURE — 56530: CPT

## 2020-12-22 VITALS — SYSTOLIC BLOOD PRESSURE: 115 MMHG | DIASTOLIC BLOOD PRESSURE: 77 MMHG

## 2020-12-22 VITALS — SYSTOLIC BLOOD PRESSURE: 118 MMHG | DIASTOLIC BLOOD PRESSURE: 78 MMHG

## 2020-12-22 VITALS — DIASTOLIC BLOOD PRESSURE: 79 MMHG | SYSTOLIC BLOOD PRESSURE: 120 MMHG

## 2020-12-22 VITALS — SYSTOLIC BLOOD PRESSURE: 143 MMHG | DIASTOLIC BLOOD PRESSURE: 99 MMHG

## 2020-12-22 LAB
ANION GAP SERPL CALC-SCNC: 8 MMOL/L (ref 7–16)
BUN SERPL-MCNC: 16 MG/DL (ref 7–18)
CALCIUM SERPL-MCNC: 8.9 MG/DL (ref 8.5–10.1)
CHLORIDE SERPL-SCNC: 101 MMOL/L (ref 98–107)
CO2 SERPL-SCNC: 25 MMOL/L (ref 21–32)
CREAT SERPL-MCNC: 1 MG/DL (ref 0.7–1.3)
ERYTHROCYTE [DISTWIDTH] IN BLOOD BY AUTOMATED COUNT: 12.7 % (ref 10.5–14.5)
GLUCOSE SERPL-MCNC: 215 MG/DL (ref 74–106)
HCT VFR BLD CALC: 38.7 % (ref 42–52)
HGB BLD-MCNC: 13.1 GM/DL (ref 14–18)
MAGNESIUM SERPL-MCNC: 1.9 MG/DL (ref 1.8–2.4)
MCH RBC QN AUTO: 31 PG (ref 26–34)
MCHC RBC AUTO-ENTMCNC: 33.8 G/DL (ref 28–37)
MCV RBC: 91.7 FL (ref 80–100)
PLATELET # BLD: 195 THOU/UL (ref 150–400)
POTASSIUM SERPL-SCNC: 4.5 MMOL/L (ref 3.5–5.1)
RBC # BLD AUTO: 4.22 MIL/UL (ref 4.5–6)
SODIUM SERPL-SCNC: 134 MMOL/L (ref 136–145)
WBC # BLD AUTO: 8.7 THOU/UL (ref 4–11)

## 2020-12-22 NOTE — NUR
Assumed care of pt at 0700. a&ox4. Pain controlled with prn pain meds.
Dressing c/d/i. Hemovac drain removed. Pt worked with physical therapy and got
dizzy while walking. OLGA hose and SCDs in place. Call light within reach. Fall
precautions in place. Will continue to monitor.

## 2020-12-22 NOTE — NUR
ASSUMED CARE OF PT @2100 PT A&OX4. RATES PAIN 3/10. MANAGED WITH PO PAIN PILL.
HEMOVAC OUTPUT DRAINED 250CC. IV INTACT WITH FLUIDS INFUSING. RT HIP DRESSING
C/D/I. ICE PACK IN PLACE. SCD'S, TEDHOSE ON JUAN LOWER EXT. FALL PREC IN PLACE
AND CALL LIGHT AT REACH WILL CONT WITH POC TILL EOS.

## 2020-12-22 NOTE — NUR
PT ADMITTED RELATED TO RT TOTAL HIP REPLACEMENT. CM REVIEWED CHART AND SPOKE
WITH CARE TEAM. CM MET WITH PT AT BEDSIDE THIS DAY. PT WAS A&0 X4. CM ROLE
INTRODUCED. PT INDICATED HE RESIDES IN A HOUSE WITH HIS SPOUSE WITH 8 STEPS TO
REACH MAIN LEVEL THROUGH GARAGE AND 8 STEPS TO BEDROOMS. PT INDICATED HE HAD
BEEN INDEPDENENT WITH GAIT AND ADLS PTA. PT WAS ISSUED A FWW FOR HOME USE BY
[employer]. IT IS ANTICIPATED THAT PT WILL DC HOME TOMORROW. CM TO FOLLOWAS
INDICATED WITH DC PLANNING.

## 2020-12-22 NOTE — O
[facility]
[address]
[place]   [address]                     OPERATIVE REPORT              
_______________________________________________________________________________
 
Name:       EDITH ABREU                Room #:         442-P       ADM IN  
M.R.#:      [id number]                       Account #:      [id number]  
Admission:  12/21/20    Attend Phys:    Kumar Son MD   
Discharge:              Date of Birth:  [birth year]  
                                                          Report #: [id number]
                                                                    [id number]   
_______________________________________________________________________________
THIS REPORT FOR:  
 
cc:  Nahum Gong James A. DO Clymer, David J. MD                                           ~
 
DATE OF SERVICE:  12/21/2020
 
 
PREOPERATIVE DIAGNOSIS:  End-stage degenerative arthritis, right hip.
 
POSTOPERATIVE DIAGNOSIS:  End-stage degenerative arthritis, right hip.
 
PROCEDURE:  Right total hip arthroplasty.
 
SURGEON:  Kumar Son MD
 
INDICATIONS:  This 60-year-old gentleman complains of severe progressive right
hip pain.  Clinical exam and x-rays confirm rather severe end-stage degenerative
arthritis.  He has tried conservative measure without much success.  He has
decided to go ahead with total hip replacement.
 
DESCRIPTION OF PROCEDURE:  The patient was taken to the operating room where he
was placed under general anesthesia.  Prophylactic intravenous antibiotics were
administered.  He was turned to the left lateral decubitus position.  The right
hip, thigh and leg were meticulously prepped and draped.  A slightly curving
posterolateral skin incision was made centered over the greater trochanter. 
This was carried through fascia and gluteus was spread bluntly.  The short
external rotators and capsule were taken down and preserved and tagged with
several #1 FiberWire sutures.  The hip was dislocated posteriorly and marked
degenerative change on the femoral head and acetabulum was noted.  A femoral
neck osteotomy was performed.  The canal was prepared using the Smith and Nephew
hip system.  A size 13 Synergy stem trial fit nicely.  The calcar was trimmed
down to an appropriate level.  The trial was removed and attention directed to
the acetabulum.  Satisfactory exposure was established and the acetabulum was
sequentially reamed, gradually advancing to a 56 mm reamer.  A Smith and Nephew
size 56, 3-hole StikTite shell was then inserted.  This was placed in alignment
with his true acetabulum, positioning this in about 45 degrees off of vertical
and 20 degrees of anteversion.  It seated nicely and appeared to be secure.  In
addition, 3 cancellous screws were placed through the apical holes engaging good
periacetabular bone with good purchase and additional stability.  A polyethylene
liner was then inserted, placing the 20-degree elevated rim at about the 10
o'clock posterior position.  It also seated nicely and appeared to be secure. 
The permanent Smith and Nephew size 13 Synergy porous high offset femoral stem
was then inserted.  This was placed in about 15-20 degrees of anteversion.  It
seated nicely and appeared to be secure.  A trial reduction was performed and
 
 
 
[address]   [address]                     OPERATIVE REPORT              
_______________________________________________________________________________
 
Name:       EDITH ABREU                Room #:         442-P       [facility] [place]  
.R.#:      [id number]                       Account #:      [id number]  
Admission:  12/21/20    Attend Phys:    Kumar Son MD   
Discharge:              Date of Birth:  [birth year]  
                                                          Report #: [id number]
                                                                    [id number]   
_______________________________________________________________________________
 
the hip seemed best suited for a size 40 head using a +4 mm neck length.  This
resulted in satisfactory alignment, range of motion, stability and leg length. 
The trial head was removed and the permanent Smith and Nephew Oxinium size 40
head with +4 mm neck length was then selected.  This was impacted on the Gloria
taper.  The hip was once again reduced and again alignment, range of motion,
stability and leg length were assessed and felt to be satisfactory.  The short
external rotators and capsule were repaired back to bone using #1 FiberWire
sutures passed through small drill holes in greater trochanter, this added
nicely to a hip stability.  A single Hemovac was left in the wound exiting
through a separate stab incision.  The fascia was closed with multiple #1 Vicryl
sutures.  The subcutaneous tissues were closed with 4-0 Monocryl.  The skin was
closed with skin staples.  A sterile dressing was applied.  The patient was
awakened and returned to recovery room in good condition.
 
 
 
 
 
 
 
 
 
 
 
 
 
 
 
 
 
 
 
 
 
 
 
 
 
 
 
 
 
 
  <ELECTRONICALLY SIGNED>
   By: Kumar Son MD           
  12/22/20     0808
D: 12/21/20 1124                           _____________________________________
T: 12/21/20 1132                           Kumar Son MD             /nt

## 2020-12-23 VITALS — SYSTOLIC BLOOD PRESSURE: 131 MMHG | DIASTOLIC BLOOD PRESSURE: 84 MMHG

## 2020-12-23 VITALS — DIASTOLIC BLOOD PRESSURE: 84 MMHG | SYSTOLIC BLOOD PRESSURE: 131 MMHG

## 2020-12-23 LAB
ERYTHROCYTE [DISTWIDTH] IN BLOOD BY AUTOMATED COUNT: 12.7 % (ref 10.5–14.5)
HCT VFR BLD CALC: 37.7 % (ref 42–52)
HGB BLD-MCNC: 12.7 GM/DL (ref 14–18)
MCH RBC QN AUTO: 30.9 PG (ref 26–34)
MCHC RBC AUTO-ENTMCNC: 33.7 G/DL (ref 28–37)
MCV RBC: 91.5 FL (ref 80–100)
PLATELET # BLD: 185 THOU/UL (ref 150–400)
RBC # BLD AUTO: 4.12 MIL/UL (ref 4.5–6)
WBC # BLD AUTO: 8.2 THOU/UL (ref 4–11)

## 2020-12-23 NOTE — NUR
DISCHARGE PAPERS REVIEWED AT THIS TIME. SIGNED AND COPY IN CHART. IV ACSESS
DCD. PT HAD WALKER DELIVERED WILL GO WITH HIM. ALL BELONGINGS PACKED AND TO BE
SENT WITH PATIENT. PT W/O PAIN OR RESP DISTRESS. PT WAS GIVEN RX'S AT PRE-OP
APPT.

## 2020-12-23 NOTE — NUR
DCing home today with plan for outpt therapy. FWW issued per provider plus
liason for home use. No other needs.

## 2020-12-23 NOTE — NUR
DR TRAN HERE STATES PATIENT MAY GO HOME AFTER THERAPY IF PATIENT THINKS HE
IS READY OTHERWISE CAN GO THURSDAY PT UP IN BEDSIDE CHAIR GIVEN AM MEDS
INCLUDING PRN PAIN MED THAT WAS REQUESTED.

## 2020-12-23 NOTE — NUR
PATIENT TAKEN TO ER ENTRANCE VIA W/C TO DISCHARGE TO HOME WITH WIFE. ALL
BELONINGS SENT WITH PATIENT. PT W/O PAIN OR RESP DISTRESS AT DISCHARGE

## 2020-12-23 NOTE — NUR
PROGRESS
 
PT UP WITH GB AND WALKER INCISION TO RIGHT HIP INTACT WITH BOX STILL BLINKING
. TAKING HYDROCODONE SPARINGLY VOIDING QS HAD A BM THIS AM EATING WITHOUT
DIFFICULTY. POSSIBLE DC HOME TODAY.

## 2020-12-24 NOTE — D
[facility]
[address]
[place]   [address]                     DISCHARGE SUMMARY             
_______________________________________________________________________________
 
Name:       EDITH ABREU                Room #:         442-P       [facility] [place]  
M.R.#:      [id number]                       Account #:      [id number]  
Admission:  12/21/20    Attend Phys:    Kumar Son MD   
Discharge:  12/23/20    Date of Birth:  [birth year]  
                                                          Report #: [id number]
                                                                    [id number]   
_______________________________________________________________________________
THIS REPORT FOR:  
 
cc:  Nauhm Gong James A. DO Clymer, David J. MD                                           ~
DATE OF SERVICE:  12/23/2020
 
 
FINAL DIAGNOSIS:  End-stage degenerative arthritis, right hip.
 
OPERATION PROCEDURES:  Right total hip arthroplasty.
 
HISTORY OF PRESENT ILLNESS:  This 60-year-old gentleman presents with chronic
progressive right hip pain.  Clinical and radiographic findings are consistent
with moderately severe degenerative arthritis.  He has tried conservative
measures without benefit and has elected to go ahead with right total hip
replacement.
 
HOSPITAL COURSE:  The patient was admitted and taken to the operating room on
12/21/2020.  He underwent right total hip arthroplasty, which he tolerated quite
nicely.  Postoperatively, his course was largely unremarkable, although he did
have a moderate amount of discomfort, making progress with therapy difficult and
slow.  The dressing has remained dry.  The Hemovac output was minimal and the
drain was removed.  He was able to resume a regular diet, although he did have
moderate nausea in the first 24 hours, making that difficult.  That did resolve
and he was able to move off of IV fluids on to a regular diet without too much
difficulty on the second today.  He did start with physical therapy, but had a
good deal of discomfort and difficulty initially.  He managed this with pain
medications, but this also caused some nausea.  Today, he seems to be making
much better progress and seems to be on course toward independent ambulation
using a walker for balance and safety.  Pending his continued progress, our plan
is for discharge home today on 12/23/2020.
 
DISCHARGE MEDICATIONS:  Include Xarelto 10 mg daily and hydrocodone 10 mg q.4-6
hours p.r.n. for pain.  He will continue on his other routine medications, which
include medical management for his type 2 diabetes.
 
He will call me if there are any problems or questions.  He will continue with
gentle independent activity and exercise at home over the coming week or two.  I
will plan to see him back in my office in 10-14 days and would anticipate suture
 
 
 
[address]   [address]                     DISCHARGE SUMMARY             
_______________________________________________________________________________
 
Name:       BRADYEDITH BEATRIZ                Room #:         442-P       [facility] [place]  
.R.#:      [id number]                       Account #:      [id number]  
Admission:  12/21/20    Attend Phys:    Kumar Son MD   
Discharge:  12/23/20    Date of Birth:  [birth year]  
                                                          Report #: [id number]
                                                                    [id number]   
_______________________________________________________________________________
removal and advancing activities at that time.  He is instructed to call or come
in sooner should there be any problems or questions.
 
 
 
 
 
 
 
 
 
 
 
 
 
 
 
 
 
 
 
 
 
 
 
 
 
 
 
 
 
 
 
 
 
 
 
 
 
 
 
 
 
 
  <ELECTRONICALLY SIGNED>
   By: Kumar Son MD           
  12/24/20     0824
D: 12/23/20 1443                           _____________________________________
T: 12/23/20 1718                           Kumar Son MD             /nt

## 2021-12-03 ENCOUNTER — HOSPITAL ENCOUNTER (OUTPATIENT)
Dept: HOSPITAL 35 - ULTRA | Age: 61
End: 2021-12-03
Attending: FAMILY MEDICINE
Payer: COMMERCIAL

## 2021-12-03 DIAGNOSIS — K86.89: ICD-10-CM

## 2021-12-03 DIAGNOSIS — K76.0: Primary | ICD-10-CM
